# Patient Record
Sex: FEMALE | Race: WHITE
[De-identification: names, ages, dates, MRNs, and addresses within clinical notes are randomized per-mention and may not be internally consistent; named-entity substitution may affect disease eponyms.]

---
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
## 2018-09-03 ENCOUNTER — HOSPITAL ENCOUNTER (EMERGENCY)
Dept: HOSPITAL 80 - FED | Age: 18
Discharge: HOME | End: 2018-09-03
Payer: COMMERCIAL

## 2018-09-03 VITALS — SYSTOLIC BLOOD PRESSURE: 120 MMHG | DIASTOLIC BLOOD PRESSURE: 83 MMHG

## 2018-09-03 DIAGNOSIS — Z87.448: ICD-10-CM

## 2018-09-03 DIAGNOSIS — N30.00: ICD-10-CM

## 2018-09-03 DIAGNOSIS — Z87.440: ICD-10-CM

## 2018-09-03 DIAGNOSIS — M54.9: Primary | ICD-10-CM

## 2018-09-03 LAB — PLATELET # BLD: 222 10^3/UL (ref 150–400)

## 2018-09-03 NOTE — EDPHY
H & P


Stated Complaint: diagnosed with UTI Monday, last night pressure/urgecy/back 

pain returned


Time Seen by Provider: 09/03/18 09:54





- Personal History


LMP (Females 10-55): 15-21 Days Ago





- Medical/Surgical History


Hx Asthma: No


Hx Chronic Respiratory Disease: No


Hx Diabetes: No


Hx Cardiac Disease: No


Hx Renal Disease: No


Hx Cirrhosis: No


Hx Alcoholism: No


Hx HIV/AIDS: No


Hx Splenectomy or Spleen Trauma: No


Other PMH: broken arm, UTIs, renal failure 2017





- Social History


Smoking Status: Never smoked


Constitutional: 


 Initial Vital Signs











Temperature (C)  37 C   09/03/18 09:44


 


Heart Rate  82   09/03/18 09:44


 


Respiratory Rate  18   09/03/18 09:44


 


Blood Pressure  120/83 H  09/03/18 09:44


 


O2 Sat (%)  100   09/03/18 09:44








 











O2 Delivery Mode               Room Air














Allergies/Adverse Reactions: 


 





Penicillins Allergy (Verified 09/03/18 09:43)


 








Home Medications: 














 Medication  Instructions  Recorded


 


NORGESTIMATE-ETHINYL ESTRADIOL 1 tab PO DAILY 05/10/17





[PREVIFEM]  


 


Cephalexin [Keflex (RX)] 500 mg PO QID #40 cap 09/03/18


 


Nitrofurantoin  09/03/18


 


Ondansetron Odt [Zofran Odt 4 mg 4 mg PO Q4 PRN #10 tab 09/03/18





(RX)]  














Medical Decision Making


ED Course/Re-evaluation: 





CHIEF COMPLAINT:  UTI symptoms, flank pain





HISTORY OF PRESENT ILLNESS:  The patient is a 19 y/o female arriving with her 

boyfriend complaining of recurrent UTI symptoms over the last day. She was 

recently diagnosed with a UTI one week ago and prescribed nitrofurantoin. Apart 

from mild and intermittent subjective fevers, she seemed to be improving and 

her urinary symptoms improved. Last night she developed bladder pressure and 

urgency again and this morning she had bilateral lower back pain. She has 

associated loss of appetite and nausea. Symptoms feel similar to prior 

pyelonephritis. She denies vomiting, diarrhea, sore throat, rhinorrhea, dyspnea

, cough, or other symptoms. 





REVIEW OF SYSTEMS:  





A comprehensive 10 system review of systems is otherwise negative aside from 

elements mentioned in the history of present illness and medical decision 

making.





PHYSICAL EXAM:  





HR, BP, O2 Sat, RR.  Temp noted


General Appearance:  Alert, well hydrated, appropriate, and non-toxic appearing.


Head:  Atraumatic without scalp tenderness or obvious injury


Eyes:  Pupils equal, round, reactive to light and accommodation, EOMI, no trauma

, no injection.


Nose:  Atraumatic, no rhinorrhea, clear.


Throat:  Mucus membranes moist.


Neck:  Supple, nontender, no lymphadenopathy.


Respiratory:  No retractions, no distress, no wheezes, and no accessory muscle 

use.  Lungs are clear to auscultation bilaterally.


Cardiovascular:  Regular rate and rhythm, no murmurs, rubs, or gallops. Good 

capillary refill all extremities.


Gastrointestinal:  Abdomen is soft, nontender, non-distended, no masses, no 

rebound, no guarding, no peritoneal signs.


Musculoskeletal:  Normal active ROM of all extremities, atraumatic. Bilateral 

CVA tenderness. 


Neurological:  Alert, appropriate, and interactive.  The patient has non-focal 

cranial nerves, motor, sensory, and cerebellar exam.


Skin:  No rashes, good turgor, no nodules on palpation.





Past medical history: UTIs, pyelonephritis


Past surgical history: Noncontributory


Family history: Noncontributory


Social history: Friend at bedside. [employer] student. From [place]. 





DIFFERENTIAL DIAGNOSIS:   The differential diagnosis for the patient's fever 

included but was not limited to pneumonia, urinary tract infection, viral 

syndrome, meningitis, and sepsis.





MEDICAL DECISION MAKING:  





This is a normally healthy 19 y/o female who was recently diagnosed with a UTI 

and returns with recurrent symptoms onset last night. She has bilateral flank 

tenderness on exam. She is afebrile. Plan for UA and treatment with PO Keflex 

and Zofran. Patient declined IV. 





Spoke with patient's [place]state PCP at their request and updated him on 

patient's condition. He would like UA sensitivities sent to his office if 

possible. 





Patient has opted to have CBC and CHEM drawn prior to discharge at her mother's 

and doctor's request. 





- Data Points


Laboratory Results: 


 











  09/03/18





  10:10


 


Urine Color  YELLOW 





  


 


Urine Appearance  HAZY 





  


 


Urine pH  6.0 





   (5.0-7.5) 


 


Ur Specific Gravity  1.009 





   (1.002-1.030) 


 


Urine Protein  NEGATIVE 





   (NEGATIVE) 


 


Urine Ketones  NEGATIVE 





   (NEGATIVE) 


 


Urine Blood  1+  H 





   (NEGATIVE) 


 


Urine Nitrate  NEGATIVE 





   (NEGATIVE) 


 


Urine Bilirubin  NEGATIVE 





   (NEGATIVE) 


 


Urine Urobilinogen  NEGATIVE EU EU





   (0.2-1.0) 


 


Ur Leukocyte Esterase  NEGATIVE 





   (NEGATIVE) 


 


Urine RBC  1-3 /hpf /hpf





   (0-3) 


 


Urine WBC  1-3 /hpf /hpf





   (0-3) 


 


Ur Epithelial Cells  2+ /lpf H /lpf





   (NONE-1+) 


 


Urine Bacteria  2+ /hpf H /hpf





   (NONE SEEN) 


 


Urine Mucus  TRACE /lpf /lpf





   (NONE-1+) 


 


Urine Glucose  NEGATIVE 





   (NEGATIVE) 











Medications Given: 


 








Discontinued Medications





Cephalexin HCl (Keflex)  500 mg PO EDNOW ONE


   PRN Reason: Protocol


   Stop: 09/03/18 10:04


   Last Admin: 09/03/18 10:25 Dose:  500 mg








Departure





- Departure


Disposition: Home, Routine, Self-Care


Clinical Impression: 


UTI (urinary tract infection)


Qualifiers:


 Urinary tract infection type: acute cystitis Hematuria presence: without 

hematuria Qualified Code(s): N30.00 - Acute cystitis without hematuria





Condition: Good


Instructions:  Cephalexin (By mouth), Urinary Tract Infection in Women (ED)


Additional Instructions: 


1. Take Keflex as prescribed for UTI. Be sure to complete the entire 

prescription even if you feel better. 


2. Use Zofran as prescribed when needed for nausea and vomiting. 


3. Use Tylenol and ibuprofen as directed on packaging for pain and fever over 

the next few days.


4. Increase fluid intake.


5. Follow up with your primary care provider for unimproved symptoms over the 

next 2-3 days.


6. Return for worsening of condition. 





Call back for urine results in 2 hours. 


Referrals: 


DARRYL GRAVES [Other] - As per Instructions


Prescriptions: 


Cephalexin [Keflex (RX)] 500 mg PO QID #40 cap


Ondansetron Odt [Zofran Odt 4 mg (RX)] 4 mg PO Q4 PRN #10 tab


 PRN Reason: Nausea/Vomiting, Use 1st


Report Scribed for: Angel Alex


Report Scribed by: Brenda Foote


Date of Report: 09/03/18


Time of Report: 10:00